# Patient Record
Sex: FEMALE | Race: WHITE | NOT HISPANIC OR LATINO | URBAN - METROPOLITAN AREA
[De-identification: names, ages, dates, MRNs, and addresses within clinical notes are randomized per-mention and may not be internally consistent; named-entity substitution may affect disease eponyms.]

---

## 2017-04-13 ENCOUNTER — OUTPATIENT (OUTPATIENT)
Dept: OUTPATIENT SERVICES | Facility: HOSPITAL | Age: 57
LOS: 1 days | End: 2017-04-13

## 2017-04-14 PROBLEM — Z00.00 ENCOUNTER FOR PREVENTIVE HEALTH EXAMINATION: Status: ACTIVE | Noted: 2017-04-14

## 2017-05-22 ENCOUNTER — OUTPATIENT (OUTPATIENT)
Dept: OUTPATIENT SERVICES | Facility: HOSPITAL | Age: 57
LOS: 1 days | End: 2017-05-22

## 2022-02-01 ENCOUNTER — EMERGENCY (EMERGENCY)
Facility: HOSPITAL | Age: 62
LOS: 1 days | Discharge: HOME | End: 2022-02-01
Admitting: EMERGENCY MEDICINE
Payer: COMMERCIAL

## 2022-02-01 PROCEDURE — 93010 ELECTROCARDIOGRAM REPORT: CPT

## 2022-02-01 PROCEDURE — 71045 X-RAY EXAM CHEST 1 VIEW: CPT | Mod: 26

## 2022-02-01 PROCEDURE — 99285 EMERGENCY DEPT VISIT HI MDM: CPT

## 2022-02-03 DIAGNOSIS — R07.89 OTHER CHEST PAIN: ICD-10-CM

## 2022-02-03 DIAGNOSIS — F17.200 NICOTINE DEPENDENCE, UNSPECIFIED, UNCOMPLICATED: ICD-10-CM

## 2022-02-03 DIAGNOSIS — R11.2 NAUSEA WITH VOMITING, UNSPECIFIED: ICD-10-CM

## 2022-02-03 DIAGNOSIS — Z86.16 PERSONAL HISTORY OF COVID-19: ICD-10-CM

## 2022-02-03 DIAGNOSIS — E11.9 TYPE 2 DIABETES MELLITUS WITHOUT COMPLICATIONS: ICD-10-CM

## 2024-07-19 ENCOUNTER — NON-APPOINTMENT (OUTPATIENT)
Age: 64
End: 2024-07-19

## 2024-07-19 ENCOUNTER — APPOINTMENT (OUTPATIENT)
Dept: CARDIOLOGY | Facility: CLINIC | Age: 64
End: 2024-07-19
Payer: MEDICAID

## 2024-07-19 VITALS
BODY MASS INDEX: 34.99 KG/M2 | WEIGHT: 210 LBS | HEIGHT: 65 IN | SYSTOLIC BLOOD PRESSURE: 120 MMHG | HEART RATE: 86 BPM | DIASTOLIC BLOOD PRESSURE: 66 MMHG | OXYGEN SATURATION: 95 %

## 2024-07-19 VITALS — DIASTOLIC BLOOD PRESSURE: 60 MMHG | SYSTOLIC BLOOD PRESSURE: 126 MMHG

## 2024-07-19 DIAGNOSIS — Z82.49 FAMILY HISTORY OF ISCHEMIC HEART DISEASE AND OTHER DISEASES OF THE CIRCULATORY SYSTEM: ICD-10-CM

## 2024-07-19 DIAGNOSIS — E78.00 PURE HYPERCHOLESTEROLEMIA, UNSPECIFIED: ICD-10-CM

## 2024-07-19 DIAGNOSIS — I10 ESSENTIAL (PRIMARY) HYPERTENSION: ICD-10-CM

## 2024-07-19 DIAGNOSIS — Z83.3 FAMILY HISTORY OF DIABETES MELLITUS: ICD-10-CM

## 2024-07-19 DIAGNOSIS — R06.02 SHORTNESS OF BREATH: ICD-10-CM

## 2024-07-19 DIAGNOSIS — F17.200 NICOTINE DEPENDENCE, UNSPECIFIED, UNCOMPLICATED: ICD-10-CM

## 2024-07-19 DIAGNOSIS — E11.9 TYPE 2 DIABETES MELLITUS W/OUT COMPLICATIONS: ICD-10-CM

## 2024-07-19 DIAGNOSIS — R01.1 CARDIAC MURMUR, UNSPECIFIED: ICD-10-CM

## 2024-07-19 PROCEDURE — 99204 OFFICE O/P NEW MOD 45 MIN: CPT | Mod: 25

## 2024-07-19 PROCEDURE — 93000 ELECTROCARDIOGRAM COMPLETE: CPT

## 2024-07-19 PROCEDURE — G2211 COMPLEX E/M VISIT ADD ON: CPT | Mod: NC

## 2024-07-19 RX ORDER — LISINOPRIL 20 MG/1
20 TABLET ORAL DAILY
Qty: 90 | Refills: 0 | Status: ACTIVE | COMMUNITY

## 2024-07-19 RX ORDER — ATORVASTATIN CALCIUM 40 MG/1
40 TABLET, FILM COATED ORAL
Qty: 1 | Refills: 3 | Status: ACTIVE | COMMUNITY
Start: 2024-07-19 | End: 1900-01-01

## 2024-07-19 RX ORDER — METFORMIN HYDROCHLORIDE 1000 MG/1
1000 TABLET, COATED ORAL
Refills: 0 | Status: ACTIVE | COMMUNITY

## 2024-07-19 RX ORDER — SEMAGLUTIDE 1.34 MG/ML
2 INJECTION, SOLUTION SUBCUTANEOUS
Refills: 0 | Status: ACTIVE | COMMUNITY

## 2024-07-19 RX ORDER — OMEPRAZOLE 40 MG/1
40 CAPSULE, DELAYED RELEASE ORAL
Qty: 90 | Refills: 1 | Status: ACTIVE | COMMUNITY

## 2024-07-19 RX ORDER — CETIRIZINE HYDROCHLORIDE 10 MG/1
TABLET, FILM COATED ORAL
Refills: 0 | Status: ACTIVE | COMMUNITY

## 2024-08-23 ENCOUNTER — APPOINTMENT (OUTPATIENT)
Dept: CARDIOLOGY | Facility: CLINIC | Age: 64
End: 2024-08-23
Payer: MEDICAID

## 2024-08-23 PROCEDURE — 93306 TTE W/DOPPLER COMPLETE: CPT

## 2024-08-27 DIAGNOSIS — I35.1 NONRHEUMATIC AORTIC (VALVE) INSUFFICIENCY: ICD-10-CM

## 2024-09-10 ENCOUNTER — APPOINTMENT (OUTPATIENT)
Dept: ULTRASOUND IMAGING | Facility: CLINIC | Age: 64
End: 2024-09-10
Payer: MEDICAID

## 2024-09-10 PROCEDURE — 76981 USE PARENCHYMA: CPT

## 2024-10-15 ENCOUNTER — APPOINTMENT (OUTPATIENT)
Dept: CARDIOLOGY | Facility: CLINIC | Age: 64
End: 2024-10-15

## 2025-05-29 ENCOUNTER — EMERGENCY (EMERGENCY)
Facility: HOSPITAL | Age: 65
LOS: 1 days | End: 2025-05-29
Attending: STUDENT IN AN ORGANIZED HEALTH CARE EDUCATION/TRAINING PROGRAM
Payer: COMMERCIAL

## 2025-05-29 VITALS
OXYGEN SATURATION: 93 % | HEART RATE: 105 BPM | SYSTOLIC BLOOD PRESSURE: 154 MMHG | DIASTOLIC BLOOD PRESSURE: 76 MMHG | RESPIRATION RATE: 16 BRPM | WEIGHT: 220.02 LBS | TEMPERATURE: 99 F

## 2025-05-29 LAB
ALBUMIN SERPL ELPH-MCNC: 3.9 G/DL — SIGNIFICANT CHANGE UP (ref 3.3–5.2)
ALP SERPL-CCNC: 134 U/L — HIGH (ref 40–120)
ALT FLD-CCNC: 132 U/L — HIGH
ANION GAP SERPL CALC-SCNC: 18 MMOL/L — HIGH (ref 5–17)
APPEARANCE UR: CLEAR — SIGNIFICANT CHANGE UP
AST SERPL-CCNC: 35 U/L — HIGH
BACTERIA # UR AUTO: NEGATIVE /HPF — SIGNIFICANT CHANGE UP
BASOPHILS # BLD AUTO: 0.04 K/UL — SIGNIFICANT CHANGE UP (ref 0–0.2)
BASOPHILS NFR BLD AUTO: 0.5 % — SIGNIFICANT CHANGE UP (ref 0–2)
BILIRUB SERPL-MCNC: 0.3 MG/DL — LOW (ref 0.4–2)
BILIRUB UR-MCNC: NEGATIVE — SIGNIFICANT CHANGE UP
BUN SERPL-MCNC: 9.4 MG/DL — SIGNIFICANT CHANGE UP (ref 8–20)
CALCIUM SERPL-MCNC: 8.8 MG/DL — SIGNIFICANT CHANGE UP (ref 8.4–10.5)
CAST: 1 /LPF — SIGNIFICANT CHANGE UP (ref 0–4)
CHLORIDE SERPL-SCNC: 102 MMOL/L — SIGNIFICANT CHANGE UP (ref 96–108)
CO2 SERPL-SCNC: 19 MMOL/L — LOW (ref 22–29)
COLOR SPEC: YELLOW — SIGNIFICANT CHANGE UP
CREAT SERPL-MCNC: 0.44 MG/DL — LOW (ref 0.5–1.3)
DIFF PNL FLD: ABNORMAL
EGFR: 109 ML/MIN/1.73M2 — SIGNIFICANT CHANGE UP
EGFR: 109 ML/MIN/1.73M2 — SIGNIFICANT CHANGE UP
EOSINOPHIL # BLD AUTO: 0.03 K/UL — SIGNIFICANT CHANGE UP (ref 0–0.5)
EOSINOPHIL NFR BLD AUTO: 0.4 % — SIGNIFICANT CHANGE UP (ref 0–6)
GLUCOSE SERPL-MCNC: 114 MG/DL — HIGH (ref 70–99)
GLUCOSE UR QL: NEGATIVE MG/DL — SIGNIFICANT CHANGE UP
HCG SERPL-ACNC: <4 MIU/ML — SIGNIFICANT CHANGE UP
HCT VFR BLD CALC: 40.2 % — SIGNIFICANT CHANGE UP (ref 34.5–45)
HGB BLD-MCNC: 13.8 G/DL — SIGNIFICANT CHANGE UP (ref 11.5–15.5)
IMM GRANULOCYTES # BLD AUTO: 0.12 K/UL — HIGH (ref 0–0.07)
IMM GRANULOCYTES NFR BLD AUTO: 1.5 % — HIGH (ref 0–0.9)
KETONES UR QL: NEGATIVE MG/DL — SIGNIFICANT CHANGE UP
LEUKOCYTE ESTERASE UR-ACNC: NEGATIVE — SIGNIFICANT CHANGE UP
LIDOCAIN IGE QN: 38 U/L — SIGNIFICANT CHANGE UP (ref 22–51)
LYMPHOCYTES # BLD AUTO: 1.73 K/UL — SIGNIFICANT CHANGE UP (ref 1–3.3)
LYMPHOCYTES NFR BLD AUTO: 21.2 % — SIGNIFICANT CHANGE UP (ref 13–44)
MCHC RBC-ENTMCNC: 30.5 PG — SIGNIFICANT CHANGE UP (ref 27–34)
MCHC RBC-ENTMCNC: 34.3 G/DL — SIGNIFICANT CHANGE UP (ref 32–36)
MCV RBC AUTO: 88.7 FL — SIGNIFICANT CHANGE UP (ref 80–100)
MONOCYTES # BLD AUTO: 0.41 K/UL — SIGNIFICANT CHANGE UP (ref 0–0.9)
MONOCYTES NFR BLD AUTO: 5 % — SIGNIFICANT CHANGE UP (ref 2–14)
NEUTROPHILS # BLD AUTO: 5.83 K/UL — SIGNIFICANT CHANGE UP (ref 1.8–7.4)
NEUTROPHILS NFR BLD AUTO: 71.4 % — SIGNIFICANT CHANGE UP (ref 43–77)
NITRITE UR-MCNC: NEGATIVE — SIGNIFICANT CHANGE UP
NRBC # BLD AUTO: 0 K/UL — SIGNIFICANT CHANGE UP (ref 0–0)
NRBC # FLD: 0 K/UL — SIGNIFICANT CHANGE UP (ref 0–0)
NRBC BLD AUTO-RTO: 0 /100 WBCS — SIGNIFICANT CHANGE UP (ref 0–0)
PH UR: 6 — SIGNIFICANT CHANGE UP (ref 5–8)
PLATELET # BLD AUTO: 256 K/UL — SIGNIFICANT CHANGE UP (ref 150–400)
PMV BLD: 9.7 FL — SIGNIFICANT CHANGE UP (ref 7–13)
POTASSIUM SERPL-MCNC: 3.7 MMOL/L — SIGNIFICANT CHANGE UP (ref 3.5–5.3)
POTASSIUM SERPL-SCNC: 3.7 MMOL/L — SIGNIFICANT CHANGE UP (ref 3.5–5.3)
PROT SERPL-MCNC: 7.1 G/DL — SIGNIFICANT CHANGE UP (ref 6.6–8.7)
PROT UR-MCNC: 300 MG/DL
RBC # BLD: 4.53 M/UL — SIGNIFICANT CHANGE UP (ref 3.8–5.2)
RBC # FLD: 13.2 % — SIGNIFICANT CHANGE UP (ref 10.3–14.5)
RBC CASTS # UR COMP ASSIST: 13 /HPF — HIGH (ref 0–4)
SODIUM SERPL-SCNC: 139 MMOL/L — SIGNIFICANT CHANGE UP (ref 135–145)
SP GR SPEC: 1.02 — SIGNIFICANT CHANGE UP (ref 1–1.03)
SQUAMOUS # UR AUTO: 2 /HPF — SIGNIFICANT CHANGE UP (ref 0–5)
TROPONIN T, HIGH SENSITIVITY RESULT: <6 NG/L — SIGNIFICANT CHANGE UP (ref 0–51)
UROBILINOGEN FLD QL: 1 MG/DL — SIGNIFICANT CHANGE UP (ref 0.2–1)
WBC # BLD: 8.16 K/UL — SIGNIFICANT CHANGE UP (ref 3.8–10.5)
WBC # FLD AUTO: 8.16 K/UL — SIGNIFICANT CHANGE UP (ref 3.8–10.5)
WBC UR QL: 2 /HPF — SIGNIFICANT CHANGE UP (ref 0–5)

## 2025-05-29 PROCEDURE — 93010 ELECTROCARDIOGRAM REPORT: CPT

## 2025-05-29 PROCEDURE — 99285 EMERGENCY DEPT VISIT HI MDM: CPT

## 2025-05-29 RX ORDER — ONDANSETRON HCL/PF 4 MG/2 ML
4 VIAL (ML) INJECTION ONCE
Refills: 0 | Status: COMPLETED | OUTPATIENT
Start: 2025-05-29 | End: 2025-05-29

## 2025-05-29 RX ORDER — METOCLOPRAMIDE HCL 10 MG
10 TABLET ORAL ONCE
Refills: 0 | Status: COMPLETED | OUTPATIENT
Start: 2025-05-29 | End: 2025-05-29

## 2025-05-29 RX ORDER — KETOROLAC TROMETHAMINE 30 MG/ML
15 INJECTION, SOLUTION INTRAMUSCULAR; INTRAVENOUS ONCE
Refills: 0 | Status: DISCONTINUED | OUTPATIENT
Start: 2025-05-29 | End: 2025-05-29

## 2025-05-29 RX ORDER — DIPHENHYDRAMINE HCL 12.5MG/5ML
25 ELIXIR ORAL ONCE
Refills: 0 | Status: COMPLETED | OUTPATIENT
Start: 2025-05-29 | End: 2025-05-29

## 2025-05-29 RX ORDER — METHYLPREDNISOLONE ACETATE 80 MG/ML
125 INJECTION, SUSPENSION INTRA-ARTICULAR; INTRALESIONAL; INTRAMUSCULAR; SOFT TISSUE ONCE
Refills: 0 | Status: COMPLETED | OUTPATIENT
Start: 2025-05-29 | End: 2025-05-29

## 2025-05-29 RX ADMIN — Medication 20 MILLIGRAM(S): at 21:05

## 2025-05-29 RX ADMIN — Medication 4 MILLIGRAM(S): at 21:05

## 2025-05-29 RX ADMIN — METHYLPREDNISOLONE ACETATE 125 MILLIGRAM(S): 80 INJECTION, SUSPENSION INTRA-ARTICULAR; INTRALESIONAL; INTRAMUSCULAR; SOFT TISSUE at 22:49

## 2025-05-29 RX ADMIN — Medication 1000 MILLILITER(S): at 21:05

## 2025-05-29 RX ADMIN — Medication 25 MILLIGRAM(S): at 22:49

## 2025-05-29 NOTE — ED ADULT NURSE NOTE - OBJECTIVE STATEMENT
Patient presents to ED with upper abdominal pain/distention x3 wks, w/N/V today and subjective fevers.  Patient states she was seen at [facility] [place], discharged, then seen by her PCP who sent her to this ED for further evaluation.  Currently 5/10, states heat applied helps w/pain predominately on RUQ.  Hx of DMII, HTN, COPD.  Sick contacts w/family. No acute distress noted. IV inserted. Blood drawn and sent to lab. Patient medicated. Awaiting EKG and Urine collection.

## 2025-05-29 NOTE — ED ADULT TRIAGE NOTE - CHIEF COMPLAINT QUOTE
pt c/o upper abdominal pain/distention x3 wks, w/N/V today.  Subjective fevers.  Seen at [facility] [place], discharged, then seen by her PCP who sent her to this ED for further evaluation.  Currently 5/10, states heat applied helps w/pain predominately on RUQ.  Hx of DMII, HTN, COPD.  Sick contacts w/family.

## 2025-05-29 NOTE — ED PROVIDER NOTE - PHYSICAL EXAMINATION
Gen: No acute distress, non toxic  HENT: NCAT, Mucous membranes moist, Oropharynx without exudates, uvula midline  Eyes: pink conjunctivae, EOMI, PERRL  CV: RRR, nl s1/s2.  Resp: CTAB, normal rate and effort  GI: +ttp RUQ. Abdomen soft, ND. No rebound, no guarding  : No CVAT  Neuro: A&O x 3, no focal deficits  MSK: No spine or joint tenderness to palpation, Full ROM ext x 4  Skin: No rashes. intact and perfused.

## 2025-05-29 NOTE — ED PROVIDER NOTE - ATTENDING APP SHARED VISIT CONTRIBUTION OF CARE
65yo female with pmhx HTN, DM, HLD, COPD presents to ED c/o abdominal pain, nausea, vomiting x 3 weeks. ED labs reviewed. No WBC. Alk phos 134, AST 35, . Trop <6. EKG no ischemic changes. UA no UTI. CT abd pelvis showing No acute intra-abdominal findings. A few mildly prominent liver abdominal and retroperitoneal lymph nodes. 8 mm pancreatic hypodensity. Follow-up CT/MR in 6 months is recommended to determine stability, then every 2 years thereafter if there is no interval change. US RUQ showing gallbladder sludge and enlarged fatty liver. Surgery consulted, recommending HIDA for further evaluation. Pt placed in obs for HIDA scan.

## 2025-05-29 NOTE — ED ADULT NURSE NOTE - CHIEF COMPLAINT QUOTE
pt c/o upper abdominal pain/distention x3 wks, w/N/V today.  Subjective fevers.  Seen at [facility] [place], discharged, then seen by her PCP who sent her to this ED for further evaluation.  Currently 5/10, states heat applied helps w/pain predominately on RUQ.  Hx of DMII, HTN, COPD.  Sick contacts w/family.

## 2025-05-29 NOTE — ED PROVIDER NOTE - NS ED ROS FT
Gen: denies fever, chills, fatigue, weight loss  Skin: denies rashes, laceration, bruising  HEENT: denies visual changes, ear pain, nasal congestion, throat pain  Respiratory: denies ESTRELLA, SOB, cough, wheezing  Cardiovascular: denies chest pain, palpitations, diaphoresis, LE edema  GI: +abdominal pain, +nausea, +vomiting  : denies dysuria, frequency, urgency, bowel/bladder incontinence  MSK: denies joint swelling/pain, back pain, neck pain  Neuro: denies headache, dizziness, weakness, numbness

## 2025-05-29 NOTE — ED PROVIDER NOTE - CLINICAL SUMMARY MEDICAL DECISION MAKING FREE TEXT BOX
65yo female with pmhx HTN, DM, HLD, COPD presents to ED c/o abdominal pain, nausea, vomiting x 3 weeks. 65yo female with pmhx HTN, DM, HLD, COPD presents to ED c/o abdominal pain, nausea, vomiting x 3 weeks. ED labs reviewed. No WBC. Alk phos 134, AST 35, . Trop <6. EKG no ischemic changes. UA no UTI. CT abd pelvis showing No acute intra-abdominal findings. A few mildly prominent liver abdominal and retroperitoneal lymph nodes. 8 mm pancreatic hypodensity. Follow-up CT/MR in 6 months is recommended to determine stability, then every 2 years thereafter if there is no interval change. US RUQ showing gallbladder sludge and enlarged fatty liver. Surgery consulted, recommending HIDA for further evaluation. Pt placed in obs for HIDA scan.

## 2025-05-29 NOTE — ED ADULT NURSE NOTE - EXTENSIONS OF SELF_ADULT
Is This A New Presentation, Or A Follow-Up?: Skin Lesion
How Severe Is Your Skin Lesion?: moderate
None

## 2025-05-29 NOTE — ED PROVIDER NOTE - OBJECTIVE STATEMENT
63yo female with pmhx HTN, DM, HLD, COPD presents to ED c/o abdominal pain, nausea, vomiting x 3 weeks. Pt states she went to [facility] today for same thing, was discharged and followed up with her PCP after being discharged, PCP told pt to go to ER as pt was still having abdominal pain. Pt states she vomited about 10 times today. Pt states she has a hx of gastritis, states this does feel like her gastritis, has not had endoscopy in 30 years. Pt states her pain has been intermittent. Pt denies any chest pain, SOB. Pt denies any other complaints at this time. 63yo female with pmhx HTN, DM, HLD, COPD presents to ED c/o abdominal pain, nausea, vomiting x 3 weeks. Pt states she went to [facility] today for same thing, was discharged and followed up with her PCP after being discharged, PCP told pt to go to ER as pt was still having abdominal pain. Pt states she vomited about 10 times today. Pt states she has a hx of gastritis, states this does feel like her gastritis, has not had endoscopy in 30 years. Pt states her pain has been intermittent. Surgical hx tubal ligation. Pt states last bowel movement was yesterday. Pt endorses she is still passing gas today. Pt denies any chest pain, SOB. Pt denies any other complaints at this time.

## 2025-05-30 VITALS
DIASTOLIC BLOOD PRESSURE: 74 MMHG | RESPIRATION RATE: 16 BRPM | OXYGEN SATURATION: 95 % | HEART RATE: 67 BPM | SYSTOLIC BLOOD PRESSURE: 158 MMHG

## 2025-05-30 LAB
ANION GAP SERPL CALC-SCNC: 14 MMOL/L — SIGNIFICANT CHANGE UP (ref 5–17)
BUN SERPL-MCNC: 8.5 MG/DL — SIGNIFICANT CHANGE UP (ref 8–20)
CALCIUM SERPL-MCNC: 7.5 MG/DL — LOW (ref 8.4–10.5)
CHLORIDE SERPL-SCNC: 108 MMOL/L — SIGNIFICANT CHANGE UP (ref 96–108)
CO2 SERPL-SCNC: 19 MMOL/L — LOW (ref 22–29)
CREAT SERPL-MCNC: 0.31 MG/DL — LOW (ref 0.5–1.3)
EGFR: 119 ML/MIN/1.73M2 — SIGNIFICANT CHANGE UP
EGFR: 119 ML/MIN/1.73M2 — SIGNIFICANT CHANGE UP
GLUCOSE SERPL-MCNC: 160 MG/DL — HIGH (ref 70–99)
POTASSIUM SERPL-MCNC: 3.4 MMOL/L — LOW (ref 3.5–5.3)
POTASSIUM SERPL-SCNC: 3.4 MMOL/L — LOW (ref 3.5–5.3)
RAPID RVP RESULT: SIGNIFICANT CHANGE UP
SARS-COV-2 RNA SPEC QL NAA+PROBE: SIGNIFICANT CHANGE UP
SODIUM SERPL-SCNC: 141 MMOL/L — SIGNIFICANT CHANGE UP (ref 135–145)

## 2025-05-30 PROCEDURE — 87077 CULTURE AEROBIC IDENTIFY: CPT

## 2025-05-30 PROCEDURE — 87086 URINE CULTURE/COLONY COUNT: CPT

## 2025-05-30 PROCEDURE — G0378: CPT

## 2025-05-30 PROCEDURE — 0225U NFCT DS DNA&RNA 21 SARSCOV2: CPT

## 2025-05-30 PROCEDURE — 99223 1ST HOSP IP/OBS HIGH 75: CPT

## 2025-05-30 PROCEDURE — 84702 CHORIONIC GONADOTROPIN TEST: CPT

## 2025-05-30 PROCEDURE — 87186 SC STD MICRODIL/AGAR DIL: CPT

## 2025-05-30 PROCEDURE — 76705 ECHO EXAM OF ABDOMEN: CPT | Mod: 26

## 2025-05-30 PROCEDURE — 74177 CT ABD & PELVIS W/CONTRAST: CPT | Mod: 26

## 2025-05-30 PROCEDURE — 80048 BASIC METABOLIC PNL TOTAL CA: CPT

## 2025-05-30 PROCEDURE — 99285 EMERGENCY DEPT VISIT HI MDM: CPT | Mod: 25

## 2025-05-30 PROCEDURE — 83690 ASSAY OF LIPASE: CPT

## 2025-05-30 PROCEDURE — 85025 COMPLETE CBC W/AUTO DIFF WBC: CPT

## 2025-05-30 PROCEDURE — 78226 HEPATOBILIARY SYSTEM IMAGING: CPT

## 2025-05-30 PROCEDURE — 96374 THER/PROPH/DIAG INJ IV PUSH: CPT | Mod: XU

## 2025-05-30 PROCEDURE — 96375 TX/PRO/DX INJ NEW DRUG ADDON: CPT | Mod: XU

## 2025-05-30 PROCEDURE — 80053 COMPREHEN METABOLIC PANEL: CPT

## 2025-05-30 PROCEDURE — 96376 TX/PRO/DX INJ SAME DRUG ADON: CPT | Mod: XU

## 2025-05-30 PROCEDURE — 99236 HOSP IP/OBS SAME DATE HI 85: CPT

## 2025-05-30 PROCEDURE — 81001 URINALYSIS AUTO W/SCOPE: CPT

## 2025-05-30 PROCEDURE — 93005 ELECTROCARDIOGRAM TRACING: CPT

## 2025-05-30 PROCEDURE — 76705 ECHO EXAM OF ABDOMEN: CPT

## 2025-05-30 PROCEDURE — 36415 COLL VENOUS BLD VENIPUNCTURE: CPT

## 2025-05-30 PROCEDURE — 84484 ASSAY OF TROPONIN QUANT: CPT

## 2025-05-30 PROCEDURE — 74177 CT ABD & PELVIS W/CONTRAST: CPT

## 2025-05-30 PROCEDURE — 78226 HEPATOBILIARY SYSTEM IMAGING: CPT | Mod: 26

## 2025-05-30 PROCEDURE — A9537: CPT

## 2025-05-30 RX ORDER — ATORVASTATIN CALCIUM 80 MG/1
1 TABLET, FILM COATED ORAL
Refills: 0 | DISCHARGE

## 2025-05-30 RX ORDER — LISINOPRIL 5 MG/1
1 TABLET ORAL
Refills: 0 | DISCHARGE

## 2025-05-30 RX ORDER — OMEPRAZOLE 20 MG/1
1 CAPSULE, DELAYED RELEASE ORAL
Refills: 0 | DISCHARGE

## 2025-05-30 RX ORDER — DEXTROSE 50 % IN WATER 50 %
15 SYRINGE (ML) INTRAVENOUS ONCE
Refills: 0 | Status: ACTIVE | OUTPATIENT
Start: 2025-05-30 | End: 2026-04-28

## 2025-05-30 RX ORDER — GLUCAGON 3 MG/1
1 POWDER NASAL ONCE
Refills: 0 | Status: ACTIVE | OUTPATIENT
Start: 2025-05-30 | End: 2026-04-28

## 2025-05-30 RX ORDER — INSULIN LISPRO 100 U/ML
INJECTION, SOLUTION INTRAVENOUS; SUBCUTANEOUS
Refills: 0 | Status: ACTIVE | OUTPATIENT
Start: 2025-05-30 | End: 2026-04-28

## 2025-05-30 RX ORDER — SODIUM CHLORIDE 9 G/1000ML
1000 INJECTION, SOLUTION INTRAVENOUS
Refills: 0 | Status: ACTIVE | OUTPATIENT
Start: 2025-05-30 | End: 2026-04-28

## 2025-05-30 RX ORDER — ACETAMINOPHEN 500 MG/5ML
1000 LIQUID (ML) ORAL ONCE
Refills: 0 | Status: COMPLETED | OUTPATIENT
Start: 2025-05-30 | End: 2025-05-30

## 2025-05-30 RX ORDER — DEXTROSE 50 % IN WATER 50 %
25 SYRINGE (ML) INTRAVENOUS ONCE
Refills: 0 | Status: ACTIVE | OUTPATIENT
Start: 2025-05-30

## 2025-05-30 RX ORDER — DEXTROSE 50 % IN WATER 50 %
12.5 SYRINGE (ML) INTRAVENOUS ONCE
Refills: 0 | Status: ACTIVE | OUTPATIENT
Start: 2025-05-30

## 2025-05-30 RX ORDER — ATORVASTATIN CALCIUM 80 MG/1
40 TABLET, FILM COATED ORAL AT BEDTIME
Refills: 0 | Status: ACTIVE | OUTPATIENT
Start: 2025-05-30 | End: 2026-04-28

## 2025-05-30 RX ORDER — ONDANSETRON HCL/PF 4 MG/2 ML
4 VIAL (ML) INJECTION ONCE
Refills: 0 | Status: COMPLETED | OUTPATIENT
Start: 2025-05-30 | End: 2025-05-30

## 2025-05-30 RX ORDER — LISINOPRIL 5 MG/1
20 TABLET ORAL DAILY
Refills: 0 | Status: ACTIVE | OUTPATIENT
Start: 2025-05-30 | End: 2026-04-28

## 2025-05-30 RX ORDER — METOCLOPRAMIDE HCL 10 MG
10 TABLET ORAL ONCE
Refills: 0 | Status: COMPLETED | OUTPATIENT
Start: 2025-05-30 | End: 2025-05-30

## 2025-05-30 RX ADMIN — Medication 20 MILLIGRAM(S): at 11:18

## 2025-05-30 RX ADMIN — Medication 10 MILLIGRAM(S): at 11:18

## 2025-05-30 RX ADMIN — Medication 4 MILLIGRAM(S): at 04:16

## 2025-05-30 RX ADMIN — SODIUM CHLORIDE 140 MILLILITER(S): 9 INJECTION, SOLUTION INTRAVENOUS at 13:15

## 2025-05-30 RX ADMIN — Medication 10 MILLIGRAM(S): at 00:41

## 2025-05-30 RX ADMIN — Medication 40 MILLIGRAM(S): at 06:14

## 2025-05-30 RX ADMIN — LISINOPRIL 20 MILLIGRAM(S): 5 TABLET ORAL at 06:14

## 2025-05-30 RX ADMIN — Medication 1000 MILLIGRAM(S): at 13:30

## 2025-05-30 RX ADMIN — Medication 400 MILLIGRAM(S): at 13:14

## 2025-05-30 RX ADMIN — KETOROLAC TROMETHAMINE 15 MILLIGRAM(S): 30 INJECTION, SOLUTION INTRAMUSCULAR; INTRAVENOUS at 00:40

## 2025-05-30 NOTE — ED CDU PROVIDER INITIAL DAY NOTE - OBJECTIVE STATEMENT
65yo female with pmhx HTN, DM, HLD, COPD presents to ED c/o abdominal pain, nausea, vomiting x 3 weeks. Pt states she went to [facility] today for same thing, was discharged and followed up with her PCP after being discharged, PCP told pt to go to ER as pt was still having abdominal pain. Pt states she vomited about 10 times today. Pt states she has a hx of gastritis, states this does feel like her gastritis, has not had endoscopy in 30 years. Pt states her pain has been intermittent. Surgical hx tubal ligation. Pt states last bowel movement was yesterday. Pt endorses she is still passing gas today. Pt denies any chest pain, SOB. Pt denies any other complaints at this time.

## 2025-05-30 NOTE — ED CDU PROVIDER DISPOSITION NOTE - ATTENDING CONTRIBUTION TO CARE
Patient presents with epigastric pain had some slight drug ultrasound of the gallbladder was in observation for HIDA which was negative for acute cholecystitis and surgery signed off patient also seen by GI no indication for endoscopy at this time.  Patient states much improvement in symptoms no vomiting tolerating p.o. abdominal pain has resolved patient stable for DC with follow-up given strict return precautions

## 2025-05-30 NOTE — ED CDU PROVIDER DISPOSITION NOTE - CLINICAL COURSE
Patient with epigastric pain, seen by surgery, due to findings of sludge in gallbladder, underwent HIDA, negative for acute cholecystitis, GI contacted, no indication for EGD, advises CLD and GI PCR.  Patient unable to provide stool sample.  Will give GI follow up.  Tolerated PO.  Reports pain resolved with Pepcid, RX sent to pharmacy.  Given copies of results, understands and agrees to proceed.

## 2025-05-30 NOTE — ED CDU PROVIDER DISPOSITION NOTE - PATIENT PORTAL LINK FT
You can access the FollowMyHealth Patient Portal offered by [facility] by registering at the following website: [email or web address]. By joining [employer]’s FollowMyHealth portal, you will also be able to view your health information using other applications (apps) compatible with our system.

## 2025-05-30 NOTE — ED CDU PROVIDER INITIAL DAY NOTE - PROGRESS NOTE DETAILS
patient returned from HIDA scan, still endorsing pain to upper epigastric region x 3 weeks, second ED visit, was evaluated at [facility], f/u with PCP, seen by surgery due to symptoms and sludge, underwent HIDA, no gallbladder function issue.  Patient re-assessed c/o persistent pain, reports f/u with GI (doesn't remember the name) last year and told needed EGD, couldn't due to insurance issues.  GI consulted.  Patient asking for more medication.  Will add on PPI and reglan.

## 2025-05-30 NOTE — ED ADULT NURSE REASSESSMENT NOTE - NS ED NURSE REASSESS COMMENT FT1
pt back from [facility]. meds given as ordered for abdominal cramping. patient denies any other complaints. results pending.

## 2025-05-30 NOTE — CONSULT NOTE ADULT - ASSESSMENT
63 yo F presenting with 3 wks of abdominal bloating and malaise and two days of upper abdominal pain, nausea and vomiting. She reports feel more bloated for the past few weeks. She also has been feeling more run down, not normal. No fevers or chills. She was not having any abdominal pain until two days ago when she began having upper abdominal pain which progressively became severe. She also had several episodes of N/V. She presented to [facility] ED yesterday and was discharged. She followed up with her PCP after discharge, but was told to present back to ED due to persistent symptoms. She is still having abdominal pain and nausea. No sick contacts. CT showed normal GB, bile ducts, GI tract. Abd US showed mildly distended GB with sludge, no wall thickening, normal bile ducts. HIDA was negative for acute cholecystitis. Labs showed no leukocytosis, normal bilis, mild elevation in ALP, ALT.     # N/V, upper abdominal pain  # Mild elevation in liver tests  Most likely viral gastroenteritis    - Check RVP  - If diarrhea, check GI PCR  - IV PPI BID  - IV zofran prn  - Pain control  - Trend liver tests daily  - No plan for EGD at this time due to acute nature of symptoms. If no improvement with supportive care and viral work up negative, will plan for EGD Monday.
Assessment: Patient is a 63 y/o female presenting with several weeks of nv and associated upper abdominal pain.    Plan:  -No acute surgical intervention. Patient w/o gallstones or hx that typically coincides with acute cholecystitis. Patient with continued n/v so recommend HIDA  -Further recs to follow, although low suspicion for acute cholecystitis.     Discussed with attending surgeon Dr. Luis

## 2025-05-30 NOTE — CHART NOTE - NSCHARTNOTEFT_GEN_A_CORE
HIDA scan reviewed, no radionuclide evidence of acute cholecystitis. Surgery will sign off at this time. Please reconsult as needed.

## 2025-05-30 NOTE — CONSULT NOTE ADULT - SUBJECTIVE AND OBJECTIVE BOX
HPI: patient is a 63yo female with pmhx HTN, DM, HLD, COPD presents to ED c/o abdominal pain, nausea, vomiting x 3 weeks. Pt states she went to [facility] today for same thing, was discharged and followed up with her PCP after being discharged, PCP told pt to go to ER as pt was still having abdominal pain. Pt states she vomited about 10 times today. Pt states she has a hx of gastritis, states this does feel like her gastritis, has not had endoscopy in 30 years. Pt states her pain has been intermittent. Pt denies any chest pain, SOB. Pt denies any other complaints at this time.    Patient seen at the bedside and evaluated. Continues to have nausea with mild upper abdominal pain.                         13.8   8.16  )-----------( 256      ( 29 May 2025 21:00 )             40.2   05-29    139  |  102  |  9.4  ----------------------------<  114[H]  3.7   |  19.0[L]  |  0.44[L]    Ca    8.8      29 May 2025 21:00    TPro  7.1  /  Alb  3.9  /  TBili  0.3[L]  /  DBili  x   /  AST  35[H]  /  ALT  132[H]  /  AlkPhos  134[H]  05-29      VITALS:   T(C): 36.7 (05-29-25 @ 23:53), Max: 37.1 (05-29-25 @ 19:33)  HR: 75 (05-29-25 @ 23:53) (75 - 105)  BP: 195/83 (05-29-25 @ 23:53) (154/76 - 195/83)  RR: 16 (05-29-25 @ 23:53) (16 - 16)  SpO2: 93% (05-29-25 @ 23:53) (93% - 93%)    GENERAL: NAD, lying in bed comfortably  HEAD:  Atraumatic, normocephalic  EYES: Conjunctiva and sclera clear  ENT: Dry mucous membranes  NECK: Supple, no JVD  HEART: Regular rate and rhythm  LUNGS: Unlabored respirations.    ABDOMEN: Soft, mild epigastric and RUQ ttp, nd and no rebound or guarding   EXTREMITIES: 2+ peripheral pulses bilaterally. No clubbing, cyanosis, or edema  NERVOUS SYSTEM:  A&Ox3, no focal deficits   SKIN: No rashes or lesions    < from: US Abdomen Upper Quadrant Right (05.30.25 @ 03:38) >    IMPRESSION:    Gallbladder sludge.  Enlarged fatty liver.    < end of copied text >  < from: CT Abdomen and Pelvis w/ IV Cont (05.30.25 @ 02:58) >    IMPRESSION:  No acute intra-abdominal findings.    A few mildly prominent liver abdominal and retroperitoneallymph nodes.    8 mm pancreatic hypodensity. Follow-up CT/MR in 6 months is recommended   to determine stability, then every 2 years thereafter if there is no   interval change.    < end of copied text >  
  Chief Complaint:  Patient is a 64y old  Female who presents with a chief complaint of N/V and abdominal pain      HPI:  65 yo F presenting with 3 wks of abdominal bloating and malaise and two days of upper abdominal pain, nausea and vomiting. She reports feel more bloated for the past few weeks. She also has been feeling more run down, not normal. No fevers or chills. She was not having any abdominal pain until two days ago when she began having upper abdominal pain which progressively became severe. She also had several episodes of N/V. She presented to [facility] ED yesterday and was discharged. She followed up with her PCP after discharge, but was told to present back to ED due to persistent symptoms. She is still having abdominal pain and nausea. No sick contacts. CT showed normal GB, bile ducts, GI tract. Abd US showed mildly distended GB with sludge, no wall thickening, normal bile ducts. HIDA was negative for acute cholecystitis. Labs showed no leukocytosis, normal bilis, mild elevation in ALP, ALT.     PAST MEDICAL & SURGICAL HISTORY:      REVIEW OF SYSTEMS:   General: Negative  HEENT: Negative  CV: Negative  Respiratory: Negative  GI: See HPI  : Negative  MSK: Negative  Hematologic: Negative  Skin: Negative    MEDICATIONS:   MEDICATIONS  (STANDING):  acetaminophen   IVPB .. 1000 milliGRAM(s) IV Intermittent once  atorvastatin 40 milliGRAM(s) Oral at bedtime  dextrose 5%. 1000 milliLiter(s) (50 mL/Hr) IV Continuous <Continuous>  dextrose 5%. 1000 milliLiter(s) (100 mL/Hr) IV Continuous <Continuous>  dextrose 50% Injectable 25 Gram(s) IV Push once  dextrose 50% Injectable 12.5 Gram(s) IV Push once  dextrose 50% Injectable 25 Gram(s) IV Push once  glucagon  Injectable 1 milliGRAM(s) IntraMuscular once  insulin lispro (ADMELOG) corrective regimen sliding scale   SubCutaneous three times a day before meals  lactated ringers. 1000 milliLiter(s) (140 mL/Hr) IV Continuous <Continuous>  lisinopril 20 milliGRAM(s) Oral daily  pantoprazole    Tablet 40 milliGRAM(s) Oral before breakfast    MEDICATIONS  (PRN):  dextrose Oral Gel 15 Gram(s) Oral once PRN Blood Glucose LESS THAN 70 milliGRAM(s)/deciliter          DIET:  Diet, NPO:   NPO for Procedure/Test     NPO Start Date: 30-May-2025,   NPO Start Time: 09:44 (05-30-25 @ 09:44) [Active]  Diet, NPO (05-30-25 @ 06:01) [Active]          ALLERGIES:   Allergies    contrast media (gadolinium-based) (Pruritus; Rash)  states meds w/pink or green dyes (Pruritus; Rash)    Intolerances        Substance Use:   (  ) never used  (  ) other:  Tobacco Usage:  (   ) never smoked   (   ) former smoker   (   ) current smoker  (     ) pack year  (        ) last cigarette date  Alcohol Usage:    Family History   IBD (  ) Yes   (  ) No  GI Malignancy (  )  Yes    (  ) No    Health Management  Last Colonoscopy:  Last Endoscopy:     VITAL SIGNS:   Vital Signs Last 24 Hrs  T(C): 36.9 (30 May 2025 05:59), Max: 37.1 (29 May 2025 19:33)  T(F): 98.4 (30 May 2025 05:59), Max: 98.8 (29 May 2025 19:33)  HR: 68 (30 May 2025 05:59) (68 - 105)  BP: 190/83 (30 May 2025 05:59) (154/76 - 195/83)  BP(mean): --  RR: 16 (30 May 2025 05:59) (16 - 16)  SpO2: 92% (30 May 2025 05:59) (92% - 93%)    Parameters below as of 30 May 2025 05:59  Patient On (Oxygen Delivery Method): room air      I&O's Summary      PHYSICAL EXAM:   GENERAL:  No acute distress  HEENT:  NC/AT, conjunctiva clear, sclera anicteric  CHEST:  No increased effort  HEART:  Regular rate  ABDOMEN:  Soft, +TTP in epigastric region and RUQ, non-distended, no rebound or guarding  EXTREMITIES: No edema  SKIN:  Warm, dry  NEURO:  Calm, cooperative    LABS:                        13.8   8.16  )-----------( 256      ( 29 May 2025 21:00 )             40.2     Hemoglobin: 13.8 g/dL (05-29-25 @ 21:00)    05-30    141  |  108  |  8.5  ----------------------------<  160[H]  3.4[L]   |  19.0[L]  |  0.31[L]    Ca    7.5[L]      30 May 2025 07:10    TPro  7.1  /  Alb  3.9  /  TBili  0.3[L]  /  DBili  x   /  AST  35[H]  /  ALT  132[H]  /  AlkPhos  134[H]  05-29    LIVER FUNCTIONS - ( 29 May 2025 21:00 )  Alb: 3.9 g/dL / Pro: 7.1 g/dL / ALK PHOS: 134 U/L / ALT: 132 U/L / AST: 35 U/L / GGT: x                 Lipase: 38 U/L (05-29-25 @ 21:00)                                    RADIOLOGY & ADDITIONAL STUDIES:      ACC: [id number] EXAM:  NM HEPATOBILIARY IMG   ORDERED BY: HIRAM JOSHI     PROCEDURE DATE:  05/30/2025          INTERPRETATION:  RADIOPHARMACEUTICAL: 3 mCi Tc-99m-Mebrofenin, I.V.    CLINICAL INFORMATION: 64 years-old Female with right upper quadrant pain;   gallbladder sludge on sono. Patient is referred to evaluate for acute   cholecystitis.    TECHNIQUE:  Dynamic imaging of the anterior abdomen was performed for 80   minutes following radiopharmaceutical injection. Static images of the   abdomenin the anterior, right anterior oblique and right lateral views   were obtained immediately thereafter.    COMPARISON: No prior HIDA scan    OTHER STUDIES USED FOR CORRELATION: CT abdomen pelvis 5/30/2025    FINDINGS: There is prompt, homogeneous uptake of radiopharmaceutical by   the hepatocytes. Activity is first seen in the gallbladder at about 15    minutes and in the bowel at about 60 minutes. There is good clearance of   activity from the liver by the end of the study.    IMPRESSION: Normalhepatobiliary scan. No radionuclide evidence of acute   cholecystitis.    --- End of Report ---            ZULAY VELEZ MD; Attending Radiologist  This document has been electronically signed. May 30 2025  9:16AM  05-30-25 @ 09:06    ACC: [id number] EXAM:  CT ABDOMEN AND PELVIS IC   ORDERED BY: MARIANA ALVES     PROCEDURE DATE:  05/30/2025          INTERPRETATION:  CLINICAL INFORMATION: Abdominal pain right upper   quadrant and periumbilical    COMPARISON: 5/29/2025.    CONTRAST/COMPLICATIONS:  IV Contrast: Omnipaque 350  90 cc administered   10 cc discarded  Oral Contrast: NONE  .    PROCEDURE:  CT of the Abdomen and Pelvis was performed.  Sagittal and coronal reformats were performed.    FINDINGS:  LOWER CHEST: Mild dependent atelectasis at the bilateral lung bases.   Aortic calcifications.    LIVER: Subcentimeter hypodensities too small to characterize. Steatosis.   Enlarged.  BILE DUCTS: Normal caliber.  GALLBLADDER: Within normal limits.  SPLEEN: Enlarged.  PANCREAS:8 mm pancreatic hypodensity.  ADRENALS: Bilateral intrarenal nodules 1.2 cm on the right and 1.6 cm in   length, previously characterized as adenomas on noncontrast study.  KIDNEYS/URETERS: Nonobstructing 3 mm right lower pole renal calculus. No   hydronephrosis. Subcentimeter left renal hypodensities too small to   characterize.    BLADDER: Within normal limits.  REPRODUCTIVE ORGANS: Uterus and adnexa within normal limits.    BOWEL: No bowel obstruction. Colonic diverticulosis. Appendix is normal.   Duodenal diverticulum.  PERITONEUM/RETROPERITONEUM: Within normal limits.  VESSELS: Atherosclerotic changes.  LYMPH NODES: No lymphadenopathy.  A few mildly prominent lymph nodes in   the region of the allen hepatis measuring up to 12 mm in shortaxis. A   few nonspecific subcentimeter retroperitoneal lymph nodes.  ABDOMINAL WALL: Within normal limits.  BONES: Degenerative changes.    IMPRESSION:  No acute intra-abdominal findings.    A few mildly prominent liver abdominal and retroperitoneallymph nodes.    8 mm pancreatic hypodensity. Follow-up CT/MR in 6 months is recommended   to determine stability, then every 2 years thereafter if there is no   interval change.        --- End of Report ---            KAYLA GUERRERO MD; Attending Radiologist  This document has been electronically signed. May 30 2025  3:58AM  05-30-25 @ 02:58    -- -- --   ACC: [id number] EXAM:  US ABDOMEN RT UPR QUADRANT   ORDERED BY: CLEO CHARLES     PROCEDURE DATE:  05/30/2025          INTERPRETATION:  CLINICAL INFORMATION: Right upper quadrant abdominal pain    COMPARISON: None available.    TECHNIQUE: Sonography of the right upper quadrant.    FINDINGS:  Liver: Echogenic and enlarged, measuring 22.1 cm.  Bile ducts: Normal caliber. Common bile duct measures 3 mm.  Gallbladder: Mildly distended with a small amount of sludge. No wall   thickening. The patient was premedicated prior to image acquisition.  Pancreas: Visualized portions are within normal limits.  Right kidney: 12.6 cm. No hydronephrosis.  Ascites: None.  IVC: Visualized portions are within normal limits.    IMPRESSION:    Gallbladder sludge.  Enlarged fatty liver.        --- End of Report ---            DIANNA PIERSON MD; Attending Radiologist  This document has been electronically signed. May 30 2025  4:19AM

## 2025-06-02 LAB
-  AMOXICILLIN/CLAVULANIC ACID: SIGNIFICANT CHANGE UP
-  AMPICILLIN/SULBACTAM: SIGNIFICANT CHANGE UP
-  AMPICILLIN: SIGNIFICANT CHANGE UP
-  AZTREONAM: SIGNIFICANT CHANGE UP
-  CEFAZOLIN: SIGNIFICANT CHANGE UP
-  CEFEPIME: SIGNIFICANT CHANGE UP
-  CEFOXITIN: SIGNIFICANT CHANGE UP
-  CEFTRIAXONE: SIGNIFICANT CHANGE UP
-  CEFUROXIME: SIGNIFICANT CHANGE UP
-  CIPROFLOXACIN: SIGNIFICANT CHANGE UP
-  ERTAPENEM: SIGNIFICANT CHANGE UP
-  GENTAMICIN: SIGNIFICANT CHANGE UP
-  IMIPENEM: SIGNIFICANT CHANGE UP
-  LEVOFLOXACIN: SIGNIFICANT CHANGE UP
-  MEROPENEM: SIGNIFICANT CHANGE UP
-  NITROFURANTOIN: SIGNIFICANT CHANGE UP
-  PIPERACILLIN/TAZOBACTAM: SIGNIFICANT CHANGE UP
-  TIGECYCLINE: SIGNIFICANT CHANGE UP
-  TOBRAMYCIN: SIGNIFICANT CHANGE UP
-  TRIMETHOPRIM/SULFAMETHOXAZOLE: SIGNIFICANT CHANGE UP
CULTURE RESULTS: ABNORMAL
METHOD TYPE: SIGNIFICANT CHANGE UP
ORGANISM # SPEC MICROSCOPIC CNT: ABNORMAL
ORGANISM # SPEC MICROSCOPIC CNT: SIGNIFICANT CHANGE UP
SPECIMEN SOURCE: SIGNIFICANT CHANGE UP

## 2025-06-06 ENCOUNTER — OUTPATIENT (OUTPATIENT)
Dept: OUTPATIENT SERVICES | Facility: HOSPITAL | Age: 65
LOS: 1 days | End: 2025-06-06
Payer: COMMERCIAL

## 2025-06-06 DIAGNOSIS — D64.9 ANEMIA, UNSPECIFIED: ICD-10-CM

## 2025-06-09 ENCOUNTER — RESULT REVIEW (OUTPATIENT)
Age: 65
End: 2025-06-09

## 2025-06-09 ENCOUNTER — NON-APPOINTMENT (OUTPATIENT)
Age: 65
End: 2025-06-09

## 2025-06-09 ENCOUNTER — APPOINTMENT (OUTPATIENT)
Dept: HEMATOLOGY ONCOLOGY | Facility: CLINIC | Age: 65
End: 2025-06-09
Payer: MEDICAID

## 2025-06-09 VITALS
TEMPERATURE: 98 F | BODY MASS INDEX: 36.96 KG/M2 | SYSTOLIC BLOOD PRESSURE: 124 MMHG | HEIGHT: 65 IN | DIASTOLIC BLOOD PRESSURE: 79 MMHG | OXYGEN SATURATION: 96 % | HEART RATE: 82 BPM | WEIGHT: 221.8 LBS

## 2025-06-09 PROBLEM — R16.1 SPLENOMEGALY: Status: ACTIVE | Noted: 2025-06-09

## 2025-06-09 PROBLEM — D75.1 ERYTHROCYTOSIS: Status: ACTIVE | Noted: 2025-06-09

## 2025-06-09 LAB
ALBUMIN SERPL ELPH-MCNC: 4.2 G/DL
ALP BLD-CCNC: 115 U/L
ALT SERPL-CCNC: 54 U/L
ANION GAP SERPL CALC-SCNC: 18 MMOL/L
AST SERPL-CCNC: 28 U/L
BASOPHILS # BLD AUTO: 0.06 K/UL — SIGNIFICANT CHANGE UP (ref 0–0.2)
BASOPHILS NFR BLD AUTO: 0.8 % — SIGNIFICANT CHANGE UP (ref 0–2)
BILIRUB SERPL-MCNC: 0.6 MG/DL
BUN SERPL-MCNC: 15 MG/DL
CALCIUM SERPL-MCNC: 10 MG/DL
CHLORIDE SERPL-SCNC: 101 MMOL/L
CO2 SERPL-SCNC: 18 MMOL/L
CREAT SERPL-MCNC: 0.55 MG/DL
CRP SERPL-MCNC: 24 MG/L
DEPRECATED KAPPA LC FREE/LAMBDA SER: 0.92 RATIO
EGFRCR SERPLBLD CKD-EPI 2021: 104 ML/MIN/1.73M2
EOSINOPHIL # BLD AUTO: 0.26 K/UL — SIGNIFICANT CHANGE UP (ref 0–0.5)
EOSINOPHIL NFR BLD AUTO: 3.3 % — SIGNIFICANT CHANGE UP (ref 0–6)
ERYTHROCYTE [SEDIMENTATION RATE] IN BLOOD BY WESTERGREN METHOD: 54 MM/HR
FERRITIN SERPL-MCNC: 99 NG/ML
FOLATE SERPL-MCNC: 6.2 NG/ML
GLUCOSE SERPL-MCNC: 172 MG/DL
HAPTOGLOB SERPL-MCNC: 279 MG/DL
HCT VFR BLD CALC: 43.2 % — SIGNIFICANT CHANGE UP (ref 34.5–45)
HGB BLD-MCNC: 14.6 G/DL — SIGNIFICANT CHANGE UP (ref 11.5–15.5)
IGA SERPL-MCNC: 219 MG/DL
IGG SERPL-MCNC: 625 MG/DL
IGM SERPL-MCNC: 212 MG/DL
IMM GRANULOCYTES NFR BLD AUTO: 0.4 % — SIGNIFICANT CHANGE UP (ref 0–0.9)
IRON SATN MFR SERPL: 19 %
IRON SERPL-MCNC: 68 UG/DL
KAPPA LC CSF-MCNC: 2.08 MG/DL
KAPPA LC SERPL-MCNC: 1.92 MG/DL
LDH SERPL-CCNC: 164 U/L
LYMPHOCYTES # BLD AUTO: 1.89 K/UL — SIGNIFICANT CHANGE UP (ref 1–3.3)
LYMPHOCYTES # BLD AUTO: 23.6 % — SIGNIFICANT CHANGE UP (ref 13–44)
MCHC RBC-ENTMCNC: 30.5 PG — SIGNIFICANT CHANGE UP (ref 27–34)
MCHC RBC-ENTMCNC: 33.8 G/DL — SIGNIFICANT CHANGE UP (ref 32–36)
MCV RBC AUTO: 90.4 FL — SIGNIFICANT CHANGE UP (ref 80–100)
MONOCYTES # BLD AUTO: 0.86 K/UL — SIGNIFICANT CHANGE UP (ref 0–0.9)
MONOCYTES NFR BLD AUTO: 10.8 % — SIGNIFICANT CHANGE UP (ref 2–14)
NEUTROPHILS # BLD AUTO: 4.9 K/UL — SIGNIFICANT CHANGE UP (ref 1.8–7.4)
NEUTROPHILS NFR BLD AUTO: 61.1 % — SIGNIFICANT CHANGE UP (ref 43–77)
NRBC BLD AUTO-RTO: 0 /100 WBCS — SIGNIFICANT CHANGE UP (ref 0–0)
PLATELET # BLD AUTO: 199 K/UL — SIGNIFICANT CHANGE UP (ref 150–400)
POTASSIUM SERPL-SCNC: 4.1 MMOL/L
PROT SERPL-MCNC: 6.9 G/DL
RBC # BLD: 4.78 M/UL — SIGNIFICANT CHANGE UP (ref 3.8–5.2)
RBC # FLD: 13.2 % — SIGNIFICANT CHANGE UP (ref 10.3–14.5)
SODIUM SERPL-SCNC: 138 MMOL/L
TIBC SERPL-MCNC: 362 UG/DL
UIBC SERPL-MCNC: 294 UG/DL
VIT B12 SERPL-MCNC: 507 PG/ML
WBC # BLD: 8 K/UL — SIGNIFICANT CHANGE UP (ref 3.8–10.5)
WBC # FLD AUTO: 8 K/UL — SIGNIFICANT CHANGE UP (ref 3.8–10.5)

## 2025-06-09 PROCEDURE — 85027 COMPLETE CBC AUTOMATED: CPT

## 2025-06-09 PROCEDURE — 99204 OFFICE O/P NEW MOD 45 MIN: CPT

## 2025-06-10 LAB
EPO SERPL-MCNC: 8.3 MIU/ML
TRANSFERRIN SERPL-MCNC: 277 MG/DL

## 2025-06-11 LAB — ANA SER IF-ACNC: NEGATIVE

## 2025-06-12 ENCOUNTER — APPOINTMENT (OUTPATIENT)
Dept: CARDIOLOGY | Facility: CLINIC | Age: 65
End: 2025-06-12
Payer: MEDICAID

## 2025-06-12 VITALS
SYSTOLIC BLOOD PRESSURE: 124 MMHG | BODY MASS INDEX: 36.65 KG/M2 | DIASTOLIC BLOOD PRESSURE: 86 MMHG | OXYGEN SATURATION: 95 % | HEART RATE: 76 BPM | HEIGHT: 65 IN | WEIGHT: 220 LBS

## 2025-06-12 LAB
EBV EA AB SER IA-ACNC: <5 U/ML
EBV EA AB TITR SER IF: POSITIVE
EBV EA IGG SER QL IA: 389 U/ML
EBV EA IGG SER-ACNC: NEGATIVE
EBV EA IGM SER IA-ACNC: NEGATIVE
EBV PATRN SPEC IB-IMP: NORMAL
EBV VCA IGG SER IA-ACNC: 125 U/ML
EBV VCA IGM SER QL IA: <10 U/ML
EPSTEIN-BARR VIRUS CAPSID ANTIGEN IGG: POSITIVE

## 2025-06-12 PROCEDURE — 99214 OFFICE O/P EST MOD 30 MIN: CPT | Mod: 25

## 2025-06-12 PROCEDURE — 93000 ELECTROCARDIOGRAM COMPLETE: CPT

## 2025-06-12 RX ORDER — MONTELUKAST 10 MG/1
10 TABLET, FILM COATED ORAL
Qty: 90 | Refills: 0 | Status: ACTIVE | COMMUNITY
Start: 2025-04-03

## 2025-06-12 RX ORDER — METOCLOPRAMIDE 10 MG/1
10 TABLET ORAL
Qty: 16 | Refills: 0 | Status: ACTIVE | COMMUNITY
Start: 2025-05-31

## 2025-06-12 RX ORDER — ONDANSETRON 4 MG/1
4 TABLET, ORALLY DISINTEGRATING ORAL
Qty: 10 | Refills: 0 | Status: ACTIVE | COMMUNITY
Start: 2025-06-03

## 2025-06-12 RX ORDER — TRIAMCINOLONE ACETONIDE 1 MG/G
0.1 CREAM TOPICAL
Qty: 80 | Refills: 0 | Status: ACTIVE | COMMUNITY
Start: 2025-05-24

## 2025-06-12 RX ORDER — DOXYCYCLINE HYCLATE 100 MG/1
100 TABLET, COATED ORAL
Qty: 20 | Refills: 0 | Status: ACTIVE | COMMUNITY
Start: 2025-05-24

## 2025-06-12 RX ORDER — OXYCODONE 5 MG/1
5 TABLET ORAL
Qty: 9 | Refills: 0 | Status: ACTIVE | COMMUNITY
Start: 2025-06-03

## 2025-06-12 RX ORDER — SUCRALFATE 1 G/1
1 TABLET ORAL
Qty: 21 | Refills: 0 | Status: ACTIVE | COMMUNITY
Start: 2025-05-29

## 2025-06-12 RX ORDER — FAMOTIDINE 40 MG/1
40 TABLET, FILM COATED ORAL
Qty: 30 | Refills: 0 | Status: ACTIVE | COMMUNITY
Start: 2025-05-31

## 2025-06-12 RX ORDER — ONDANSETRON 4 MG/1
4 TABLET ORAL
Qty: 8 | Refills: 0 | Status: ACTIVE | COMMUNITY
Start: 2025-05-29

## 2025-07-25 ENCOUNTER — APPOINTMENT (OUTPATIENT)
Dept: CARDIOLOGY | Facility: CLINIC | Age: 65
End: 2025-07-25